# Patient Record
Sex: MALE | Race: OTHER | Employment: STUDENT | URBAN - METROPOLITAN AREA
[De-identification: names, ages, dates, MRNs, and addresses within clinical notes are randomized per-mention and may not be internally consistent; named-entity substitution may affect disease eponyms.]

---

## 2023-02-27 ENCOUNTER — OFFICE VISIT (OUTPATIENT)
Dept: PRIMARY CARE CLINIC | Age: 23
End: 2023-02-27

## 2023-02-27 VITALS
DIASTOLIC BLOOD PRESSURE: 74 MMHG | HEIGHT: 69 IN | WEIGHT: 128.2 LBS | SYSTOLIC BLOOD PRESSURE: 107 MMHG | TEMPERATURE: 98 F | OXYGEN SATURATION: 99 % | HEART RATE: 90 BPM | RESPIRATION RATE: 16 BRPM | BODY MASS INDEX: 18.99 KG/M2

## 2023-02-27 DIAGNOSIS — K12.0 APHTHOUS ULCER: Primary | ICD-10-CM

## 2023-02-27 LAB — S PYO AG THROAT QL: NORMAL

## 2023-02-27 PROCEDURE — 87880 STREP A ASSAY W/OPTIC: CPT | Performed by: PHYSICIAN ASSISTANT

## 2023-02-27 PROCEDURE — 99213 OFFICE O/P EST LOW 20 MIN: CPT | Performed by: PHYSICIAN ASSISTANT

## 2023-02-27 RX ORDER — IBUPROFEN 200 MG
200 TABLET ORAL EVERY 6 HOURS PRN
COMMUNITY

## 2023-02-27 NOTE — PROGRESS NOTES
Chief Complaint   Mouth Lesions (Lesions on tongue started yesterday, sore throat, dry cough yesterday only. Took Ibuprofen, helped. )      History of Present Illness   Source of history provided by: patient. Gabino Clayton is a 25 y.o. old male presenting to the walk in clinic for evaluation of an ulcerated lesion underneath his tongue which began yesterday. Pain is worsened by eating and improved by nothing. Patient also reports a very mild sore throat and nonproductive cough. Pt denies any facial edema, facial redness, fever, chills, HA, or ear pain. Pt is able to handle his/her own secretions and drink fluids without difficulty. Patient has been taking ibuprofen at home with some symptomatic relief    ROS    Unless otherwise stated in this report or unable to obtain because of the patient's clinical or mental status as evidenced by the medical record, this patients's positive and negative responses for Review of Systems, constitutional, psych, eyes, ENT, cardiovascular, respiratory, gastrointestinal, neurological, genitourinary, musculoskeletal, integument systems and systems related to the presenting problem are either stated in the preceding or were not pertinent or were negative for the symptoms and/or complaints related to the medical problem. Past Medical History:  has no past medical history on file. Past Surgical History:  has no past surgical history on file. Social History:  reports that he has never smoked. He has never used smokeless tobacco. He reports that he does not drink alcohol and does not use drugs. Family History: family history is not on file. Allergies: Other    Physical Exam         VS:  /74 (Site: Right Upper Arm, Position: Sitting, Cuff Size: Medium Adult)   Pulse 90   Temp 98 °F (36.7 °C) (Temporal)   Resp 16   Ht 5' 9\" (1.753 m)   Wt 128 lb 3.2 oz (58.2 kg)   SpO2 99%   BMI 18.93 kg/m²     Constitutional:  Alert, development consistent with age.   HEENT: NC/NT. Airway patent. Eyes PERRL. Ears with normal bilateral TMs and normal bilateral canals. Neck:  Supple. Normal ROM. No adenopathy. Lips:  No erythema or abrasions noted. Mouth:  Normal tongue and moist buccal mucosa. Floor of the mouth is soft. No tenderness in the submental or submandibular space. No tongue elevation. There is a shallow ulcerated lesion noted over the inferior aspect of the tongue. Dental: No tenderness to tapping noted. No decay noted. No trismus present. No facial edema or redness noted. No drooling. Dentition is fair. Respiratory:  Clear to auscultation and breath sounds equal.    CV: Regular rate and rhythm, normal heart sounds, without pathological murmurs, ectopy, gallops, or rubs. Skin:  No rashes, erythema or lesions present, unless noted elsewhere. Lymphatics: No lymphangitis or adenopathy noted. Neurological:  Alert and oriented. Motor functions intact. Lab / Imaging Results   (All laboratory and radiology results have been personally reviewed by myself)  Labs:      Imaging: All Radiology results interpreted by Radiologist unless otherwise noted. Assessment / Plan     Impression(s):  Mohsen Guerrero was seen today for mouth lesions. Diagnoses and all orders for this visit:    Aphthous ulcer  -     POCT rapid strep A  -     Magic Mouthwash (MIRACLE MOUTHWASH); Swish and spit 5 mLs 4 times daily as needed for Irritation    Disposition:  Disposition: Discharge to home. Rapid strep is negative in office today. For acute symptomatic relief, script written for Magic mouthwash, side effects discussed. Increase fluids and rest.  Follow-up with PCP or dentist in 1 week for recheck if symptoms persist.  ED sooner if symptoms worsen or change. ED immediately with severe/worsening pain, dyspnea, dysphagia, trismus, drooling, fever, or facial edema. Pt states understanding and is in agreement with this care plan. All questions answered. Vickey Jesus Dr, PAAndreC    **This report was transcribed using voice recognition software. Every effort was made to ensure accuracy; however, inadvertent computerized transcription errors may be present.

## 2023-03-13 ENCOUNTER — OFFICE VISIT (OUTPATIENT)
Dept: PRIMARY CARE CLINIC | Age: 23
End: 2023-03-13

## 2023-03-13 VITALS
TEMPERATURE: 97.4 F | DIASTOLIC BLOOD PRESSURE: 77 MMHG | HEIGHT: 69 IN | BODY MASS INDEX: 18.96 KG/M2 | WEIGHT: 128 LBS | OXYGEN SATURATION: 99 % | RESPIRATION RATE: 18 BRPM | SYSTOLIC BLOOD PRESSURE: 102 MMHG | HEART RATE: 92 BPM

## 2023-03-13 DIAGNOSIS — U07.1 COVID-19: Primary | ICD-10-CM

## 2023-03-13 DIAGNOSIS — R06.02 SOB (SHORTNESS OF BREATH): ICD-10-CM

## 2023-03-13 PROCEDURE — 99213 OFFICE O/P EST LOW 20 MIN: CPT | Performed by: NURSE PRACTITIONER

## 2023-03-13 RX ORDER — DEXAMETHASONE 6 MG/1
6 TABLET ORAL DAILY
Qty: 7 TABLET | Refills: 0 | Status: SHIPPED | OUTPATIENT
Start: 2023-03-13 | End: 2023-03-20

## 2023-03-13 NOTE — PROGRESS NOTES
Chief Complaint   Generalized Body Aches (States he had Covid 1-2 weeks ago, is still having body aches, fatigue, and slight SOB. )    History of Present Illness   Source of history provided by:  patient. Abril Rosario is a 25 y.o. old male who presents to walk-in with complaints of body aches, fatigue, fever (Tmax 104F), cough, sore throat and some mild SOB x 10 days. States symptoms have been improving since onset, states he only has some body aches, fatigue and mild SOB mostly when he is talking remaining. Has been taking nothing for the symptoms. Currently denies any Fever, Nausea, Vomiting, Chest Pain, Abdominal Pain, Rash, or Lethargy. Denies any hx of asthma. Denies tobacco use. Patient reports he did test positive for COVID-19 on 3/13/2023. ROS   Pertinent positives and negatives are stated within HPI, all other systems reviewed and are negative. Past Medical History:  has no past medical history on file. Past Surgical History:  has no past surgical history on file. Social History:  reports that he has never smoked. He has never used smokeless tobacco. He reports that he does not drink alcohol and does not use drugs. Family History: family history is not on file. Allergies: Other    Physical Exam   Vital Signs:  /77   Pulse 92   Temp 97.4 °F (36.3 °C) (Oral)   Resp 18   Ht 5' 9\" (1.753 m)   Wt 128 lb (58.1 kg)   SpO2 99%   BMI 18.90 kg/m²    Oxygen Saturation Interpretation: Normal.    Constitutional:  Alert, development consistent with age. NAD. Head:  NC/NT. Airway patent. Ears: TMs clear bilaterally. Canals without exudate or swelling bilaterally. Mouth: Posterior pharynx with mild erythema and clear postnasal drip. There is no tonsillar hypertrophy or exudate. Neck:  Normal ROM. Supple. There is no anterior cervical adenopathy noted. Lungs: CTAB without wheezes, rales, or rhonchi.    CV:  Regular rate and rhythm, normal heart sounds, without pathological murmurs, ectopy, gallops, or rubs. Skin:  Normal turgor. Warm, dry, without visible rash. Lymphatic: No lymphangitis or adenopathy noted. Neurological:  Oriented. Motor functions intact. Lab / Imaging Results   (All laboratory and radiology results have been personally reviewed by myself)  Labs:  No results found for this visit on 03/13/23. Imaging: All Radiology results interpreted by Radiologist unless otherwise noted. No results found. Medical Decision Making   Pt non-toxic, in no apparent distress and stable at time of discharge. Assessment/Plan   Claudeen Bare was seen today for generalized body aches. Diagnoses and all orders for this visit:    COVID-19  -     dexamethasone (DECADRON) 6 MG tablet; Take 1 tablet by mouth daily for 7 days    SOB (shortness of breath)    Patient is out of isolation from COVID-19. SOB is more of breathlessness while he is talking, will treat with Decadron for symptoms. Increase fluids and rest. Symptomatic relief discussed including Tylenol prn pain/fever. Schedule f/u with PCP in 7-10 days if symptoms persist. ED sooner if symptoms worsen or change. ED immediately with high or refractory fever, progressive SOB, dyspnea, CP, calf pain/swelling, shaking chills, vomiting, abdominal pain, lethargy, flank pain, or decreased urinary output. Pt verbalizes understanding and is in agreement with plan of care. All questions answered. Return if symptoms worsen or fail to improve. Electronically signed by KOTA Rasheed CNP   DD: 3/13/23    **This report was transcribed using voice recognition software. Every effort was made to ensure accuracy; however, inadvertent computerized transcription errors may be present.

## 2023-11-01 ENCOUNTER — OFFICE VISIT (OUTPATIENT)
Dept: PRIMARY CARE CLINIC | Age: 23
End: 2023-11-01

## 2023-11-01 VITALS
TEMPERATURE: 97.4 F | WEIGHT: 128 LBS | HEART RATE: 104 BPM | DIASTOLIC BLOOD PRESSURE: 67 MMHG | SYSTOLIC BLOOD PRESSURE: 96 MMHG | HEIGHT: 69 IN | BODY MASS INDEX: 18.96 KG/M2 | OXYGEN SATURATION: 99 %

## 2023-11-01 DIAGNOSIS — B34.9 VIRAL ILLNESS: Primary | ICD-10-CM

## 2023-11-01 DIAGNOSIS — J02.9 SORE THROAT: ICD-10-CM

## 2023-11-01 LAB
Lab: NORMAL
PERFORMING INSTRUMENT: NORMAL
QC PASS/FAIL: NORMAL
S PYO AG THROAT QL: NORMAL
SARS-COV-2, POC: NORMAL

## 2023-11-01 PROCEDURE — 87880 STREP A ASSAY W/OPTIC: CPT | Performed by: NURSE PRACTITIONER

## 2023-11-01 PROCEDURE — 99213 OFFICE O/P EST LOW 20 MIN: CPT | Performed by: NURSE PRACTITIONER

## 2023-11-01 PROCEDURE — 87426 SARSCOV CORONAVIRUS AG IA: CPT | Performed by: NURSE PRACTITIONER

## 2023-11-01 SDOH — ECONOMIC STABILITY: HOUSING INSECURITY
IN THE LAST 12 MONTHS, WAS THERE A TIME WHEN YOU DID NOT HAVE A STEADY PLACE TO SLEEP OR SLEPT IN A SHELTER (INCLUDING NOW)?: NO

## 2023-11-01 SDOH — ECONOMIC STABILITY: FOOD INSECURITY: WITHIN THE PAST 12 MONTHS, THE FOOD YOU BOUGHT JUST DIDN'T LAST AND YOU DIDN'T HAVE MONEY TO GET MORE.: NEVER TRUE

## 2023-11-01 SDOH — ECONOMIC STABILITY: FOOD INSECURITY: WITHIN THE PAST 12 MONTHS, YOU WORRIED THAT YOUR FOOD WOULD RUN OUT BEFORE YOU GOT MONEY TO BUY MORE.: NEVER TRUE

## 2023-11-01 SDOH — ECONOMIC STABILITY: INCOME INSECURITY: HOW HARD IS IT FOR YOU TO PAY FOR THE VERY BASICS LIKE FOOD, HOUSING, MEDICAL CARE, AND HEATING?: NOT HARD AT ALL

## 2023-11-01 ASSESSMENT — PATIENT HEALTH QUESTIONNAIRE - PHQ9: DEPRESSION UNABLE TO ASSESS: URGENT/EMERGENT SITUATION

## 2023-11-01 NOTE — PROGRESS NOTES
rhonchi. CV:  Regular rate and rhythm, normal heart sounds, without pathological murmurs, ectopy, gallops, or rubs. Skin:  Normal turgor. Warm, dry, without visible rash. Lymphatic: No lymphangitis or adenopathy noted. Neurological:  Oriented. Motor functions intact. Lab / Imaging Results   (All laboratory and radiology results have been personally reviewed by myself)  Labs:  Results for orders placed or performed in visit on 11/01/23   POCT COVID-19, Antigen   Result Value Ref Range    SARS-COV-2, POC Not-Detected Not Detected    Lot Number [id number]     QC Pass/Fail p     Performing Instrument BinaxNOW    POCT rapid strep A   Result Value Ref Range    Strep A Ag None Detected None Detected     Imaging: All Radiology results interpreted by Radiologist unless otherwise noted. No results found. Medical Decision Making   Pt non-toxic, in no apparent distress and stable at time of discharge. Assessment/Plan   Christel Hendrix was seen today for generalized body aches, pharyngitis and nasal congestion. Diagnoses and all orders for this visit:    Viral illness    Sore throat  -     POCT COVID-19, Antigen  -     POCT rapid strep A    Rapid COVID-19 and strep negative in office, pt advised of results. Patient advised this is likely a viral illness that should resolve with time and conservative treatment. Increase fluids and rest. Symptomatic relief discussed including Tylenol prn pain/fever. Schedule f/u with PCP in 7-10 days if symptoms persist. ED sooner if symptoms worsen or change. ED immediately with high or refractory fever, progressive SOB, dyspnea, CP, calf pain/swelling, shaking chills, vomiting, abdominal pain, lethargy, flank pain, or decreased urinary output. Pt verbalizes understanding and is in agreement with plan of care. All questions answered. Return if symptoms worsen or fail to improve.      Electronically signed by KOTA Rivera CNP   DD: 11/1/23    **This report was

## 2024-03-18 ENCOUNTER — OFFICE VISIT (OUTPATIENT)
Dept: PRIMARY CARE CLINIC | Age: 24
End: 2024-03-18

## 2024-03-18 VITALS
BODY MASS INDEX: 17.52 KG/M2 | RESPIRATION RATE: 16 BRPM | HEIGHT: 70 IN | WEIGHT: 122.4 LBS | TEMPERATURE: 97.5 F | HEART RATE: 71 BPM | SYSTOLIC BLOOD PRESSURE: 101 MMHG | OXYGEN SATURATION: 100 % | DIASTOLIC BLOOD PRESSURE: 70 MMHG

## 2024-03-18 DIAGNOSIS — J02.9 VIRAL PHARYNGITIS: Primary | ICD-10-CM

## 2024-03-18 LAB — S PYO AG THROAT QL: NORMAL

## 2024-03-18 PROCEDURE — 87880 STREP A ASSAY W/OPTIC: CPT | Performed by: PHYSICIAN ASSISTANT

## 2024-03-18 PROCEDURE — 99213 OFFICE O/P EST LOW 20 MIN: CPT | Performed by: PHYSICIAN ASSISTANT

## 2024-03-18 NOTE — PROGRESS NOTES
Chief Complaint       Pharyngitis (Since yesterday)      History of Present Illness   Source of history provided by:  patient.     Tashi Resendiz is a 23 y.o. old male presenting to the walk in clinic for evaluation of sore throat since yesterday. Reports associated pain with swallowing and mild rhinorrhea.  Denies any fever, chills, loss of taste/smell, dyspnea, dysphagia, CP, SOB, cough, nausea, vomiting, rash, or lethargy.  Denies any known strep exposures.  Denies any contact with any individuals with known COVID-19 infection or under investigation for COVID-19 infection.     ROS    Unless otherwise stated in this report or unable to obtain because of the patient's clinical or mental status as evidenced by the medical record, this patients's positive and negative responses for Review of Systems, constitutional, psych, eyes, ENT, cardiovascular, respiratory, gastrointestinal, neurological, genitourinary, musculoskeletal, integument systems and systems related to the presenting problem are either stated in the preceding or were not pertinent or were negative for the symptoms and/or complaints related to the medical problem.    Past Medical History:  has no past medical history on file.  Past Surgical History:  has no past surgical history on file.  Social History:  reports that he has never smoked. He has never used smokeless tobacco. He reports that he does not drink alcohol and does not use drugs.  Family History: family history is not on file.   Allergies: Other    Physical Exam         VS:  /70   Pulse 71   Temp 97.5 °F (36.4 °C) (Oral)   Resp 16   Ht 1.778 m (5' 10\")   Wt 55.5 kg (122 lb 6.4 oz)   SpO2 100%   BMI 17.56 kg/m²    Oxygen Saturation Interpretation: Normal.    Constitutional:  Alert, development consistent with age.  Ears:  TMs translucent without perforation, injection, or bulging.  External canals clear without swelling or exudate.  Throat: Airway patent.  Posterior pharynx with

## 2024-06-07 ENCOUNTER — OFFICE VISIT (OUTPATIENT)
Dept: PRIMARY CARE CLINIC | Age: 24
End: 2024-06-07

## 2024-06-07 ENCOUNTER — HOSPITAL ENCOUNTER (OUTPATIENT)
Age: 24
Discharge: HOME | End: 2024-06-07

## 2024-06-07 ENCOUNTER — HOSPITAL ENCOUNTER (OUTPATIENT)
Dept: GENERAL RADIOLOGY | Age: 24
End: 2024-06-07

## 2024-06-07 VITALS
HEIGHT: 64 IN | BODY MASS INDEX: 21.17 KG/M2 | DIASTOLIC BLOOD PRESSURE: 73 MMHG | TEMPERATURE: 97.5 F | SYSTOLIC BLOOD PRESSURE: 111 MMHG | HEART RATE: 88 BPM | WEIGHT: 124 LBS | OXYGEN SATURATION: 98 % | RESPIRATION RATE: 16 BRPM

## 2024-06-07 DIAGNOSIS — M25.532 LEFT WRIST PAIN: Primary | ICD-10-CM

## 2024-06-07 DIAGNOSIS — M25.532 LEFT WRIST PAIN: ICD-10-CM

## 2024-06-07 PROCEDURE — 73110 X-RAY EXAM OF WRIST: CPT

## 2024-06-07 NOTE — PROGRESS NOTES
Chief Complaint:   Injury (L hand injury and cut while moving furniture at work yesterday. Woke up to swelling and pain)      History of Present Illness   Source of history provided by:  patient.     Tashi Resendiz is a 23 y.o. old male who presents to the [facility] care complaining of Left wrist pain for the past few days.  Pt states there was no traumatic incident but has been moving boxes at work.  Pt states there is associated swelling to the area. Denies any paresthesias, weakness in the extremity, abrasions, active bleeding, recent illness, or any other complaints today. Pt is right hand dominate.     Review of Systems   Unless otherwise stated in this report or unable to obtain because of the patient's clinical or mental status as evidenced by the medical record, this patients's positive and negative responses for Review of Systems, constitutional, psych, eyes, ENT, cardiovascular, respiratory, gastrointestinal, neurological, genitourinary, musculoskeletal, integument systems and systems related to the presenting problem are either stated in the preceding or were not pertinent or were negative for the symptoms and/or complaints related to the medical problem.    Past Medical History:  has no past medical history on file.   Past Surgical History:  has no past surgical history on file.  Social History:  reports that he has never smoked. He has never used smokeless tobacco. He reports that he does not drink alcohol and does not use drugs.  Family History: family history is not on file.  Allergies: Other    Physical Exam   Vital Signs:  /73   Pulse 88   Temp 97.5 °F (36.4 °C) (Oral)   Resp 16   Ht 1.626 m (5' 4\")   Wt 56.2 kg (124 lb)   SpO2 98%   BMI 21.28 kg/m²    Oxygen Saturation Interpretation: Normal.    Constitutional:  A&Ox3, development consistent with age, NAD.  CV: Heart RRR, no murmurs, rubs, or gallops.  Lungs: CTAB without W/R/R.   Wrist: Left              Tenderness:  medial radial

## 2025-01-23 ENCOUNTER — OFFICE VISIT (OUTPATIENT)
Dept: PRIMARY CARE CLINIC | Age: 25
End: 2025-01-23

## 2025-01-23 VITALS
DIASTOLIC BLOOD PRESSURE: 77 MMHG | HEART RATE: 90 BPM | WEIGHT: 133 LBS | RESPIRATION RATE: 16 BRPM | OXYGEN SATURATION: 97 % | TEMPERATURE: 97.5 F | BODY MASS INDEX: 18.62 KG/M2 | HEIGHT: 71 IN | SYSTOLIC BLOOD PRESSURE: 112 MMHG

## 2025-01-23 DIAGNOSIS — Z87.898 HISTORY OF URINE COLOR CHANGES: Primary | ICD-10-CM

## 2025-01-23 DIAGNOSIS — G47.09 OTHER INSOMNIA: ICD-10-CM

## 2025-01-23 LAB
BILIRUBIN, POC: NEGATIVE
BLOOD URINE, POC: NEGATIVE
CLARITY, POC: CLEAR
COLOR, POC: YELLOW
GLUCOSE URINE, POC: NEGATIVE MG/DL
KETONES, POC: NEGATIVE MG/DL
LEUKOCYTE EST, POC: NEGATIVE
NITRITE, POC: NEGATIVE
PH, POC: 7
PROTEIN, POC: NEGATIVE MG/DL
SPECIFIC GRAVITY, POC: 1.01
UROBILINOGEN, POC: 0.2 MG/DL

## 2025-01-23 PROCEDURE — 99213 OFFICE O/P EST LOW 20 MIN: CPT | Performed by: EMERGENCY MEDICINE

## 2025-01-23 PROCEDURE — 81002 URINALYSIS NONAUTO W/O SCOPE: CPT | Performed by: EMERGENCY MEDICINE

## 2025-01-23 ASSESSMENT — ENCOUNTER SYMPTOMS
SHORTNESS OF BREATH: 0
SORE THROAT: 0
DIARRHEA: 0
EYE PAIN: 0
VOMITING: 0
WHEEZING: 0
EYE REDNESS: 0
ABDOMINAL PAIN: 0
SINUS PRESSURE: 0
BACK PAIN: 0
EYE DISCHARGE: 0
COUGH: 0
NAUSEA: 0

## 2025-01-23 NOTE — PROGRESS NOTES
Red flag symptoms were also discussed with the patient today. If symptoms worsen the patient is to go directly to the emergency department for reevaluation and treatment. Pt verbalizes understanding and is in agreement with plan of care. All questions answered.      New Medications     New Prescriptions    No medications on file   OTC Benadryl prn sleep disturbance  STOP MELATONIN    Electronically signed by Yohan Chambers DO   DD: 1/23/25